# Patient Record
Sex: MALE | Race: OTHER | HISPANIC OR LATINO | Employment: FULL TIME | URBAN - METROPOLITAN AREA
[De-identification: names, ages, dates, MRNs, and addresses within clinical notes are randomized per-mention and may not be internally consistent; named-entity substitution may affect disease eponyms.]

---

## 2023-03-22 ENCOUNTER — APPOINTMENT (OUTPATIENT)
Dept: RADIOLOGY | Facility: MEDICAL CENTER | Age: 23
End: 2023-03-22
Attending: EMERGENCY MEDICINE

## 2023-03-22 ENCOUNTER — HOSPITAL ENCOUNTER (EMERGENCY)
Facility: MEDICAL CENTER | Age: 23
End: 2023-03-23
Attending: EMERGENCY MEDICINE

## 2023-03-22 DIAGNOSIS — M54.50 ACUTE MIDLINE LOW BACK PAIN WITHOUT SCIATICA: ICD-10-CM

## 2023-03-22 DIAGNOSIS — S32.039A CLOSED FRACTURE OF THIRD LUMBAR VERTEBRA, UNSPECIFIED FRACTURE MORPHOLOGY, INITIAL ENCOUNTER (HCC): Primary | ICD-10-CM
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
## 2023-03-22 DIAGNOSIS — S32.059A CLOSED FRACTURE OF FIFTH LUMBAR VERTEBRA, UNSPECIFIED FRACTURE MORPHOLOGY, INITIAL ENCOUNTER (HCC): ICD-10-CM

## 2023-03-22 PROCEDURE — 99283 EMERGENCY DEPT VISIT LOW MDM: CPT

## 2023-03-22 PROCEDURE — 73610 X-RAY EXAM OF ANKLE: CPT | Mod: RT

## 2023-03-22 PROCEDURE — 99283 EMERGENCY DEPT VISIT LOW MDM: CPT | Performed by: SURGERY

## 2023-03-22 PROCEDURE — 307742 HCHG YELLOW TRAUMA TEAM SERVICES

## 2023-03-22 PROCEDURE — 72131 CT LUMBAR SPINE W/O DYE: CPT

## 2023-03-23 VITALS
HEIGHT: 66 IN | BODY MASS INDEX: 32.62 KG/M2 | SYSTOLIC BLOOD PRESSURE: 146 MMHG | TEMPERATURE: 97.2 F | WEIGHT: 203 LBS | HEART RATE: 70 BPM | RESPIRATION RATE: 20 BRPM | OXYGEN SATURATION: 95 % | DIASTOLIC BLOOD PRESSURE: 57 MMHG

## 2023-03-23 PROBLEM — S32.9XXA: Status: ACTIVE | Noted: 2023-03-23

## 2023-03-23 PROBLEM — T14.90XA TRAUMA: Status: ACTIVE | Noted: 2023-03-23

## 2023-03-23 RX ORDER — ACETAMINOPHEN 500 MG
1000 TABLET ORAL EVERY 6 HOURS PRN
Qty: 20 TABLET | Refills: 0 | Status: SHIPPED | OUTPATIENT
Start: 2023-03-23 | End: 2023-03-27

## 2023-03-23 RX ORDER — IBUPROFEN 800 MG/1
800 TABLET ORAL EVERY 8 HOURS PRN
Qty: 20 TABLET | Refills: 0 | Status: SHIPPED | OUTPATIENT
Start: 2023-03-23 | End: 2023-03-26

## 2023-03-23 RX ORDER — CYCLOBENZAPRINE HCL 10 MG
10 TABLET ORAL 3 TIMES DAILY PRN
Qty: 10 TABLET | Refills: 0 | Status: SHIPPED | OUTPATIENT
Start: 2023-03-23 | End: 2023-03-26

## 2023-03-23 NOTE — ED NOTES
Pt BIB self, walked into [facility]. Was working earlier in the day cutting trees, fell from ladder greater than 20ft. Went home, later in day was feeling discomfort to posterior spine and right ankle, walked into ED.

## 2023-03-23 NOTE — H&P
"    CHIEF COMPLAINT: Back pain.     HISTORY OF PRESENT ILLNESS: The patient is a 22 year-old  young man who was injured in a fall. The patient experienced a 20 foot fall while working on a ladder cutting tree branches. The patient's injury occurred at approximately 1100 AM today. After the incident he rested for a period of time and when he noted he had persistent low back pain he presented to the [facility] ER for further evaluation. He had no loss of consciousness. The patient denies any chronic anticoagulation or antiplatelet medications. He complains of pain in his lower back. He denies weakness, numbness, or paresthesias.    TRIAGE CATEGORY: The patient was triaged as a Trauma Yellow Activation. An expeditious primary and secondary survey with required adjuncts was conducted. See Trauma Narrator for full details.    PAST MEDICAL HISTORY:  has no past medical history on file.    PAST SURGICAL HISTORY:  has no past surgical history on file.    ALLERGIES: No Known Allergies    CURRENT MEDICATIONS:   Home Medications    **Home medications have not yet been reviewed for this encounter**       FAMILY HISTORY: family history is not on file.    SOCIAL HISTORY:  reports that he has never smoked. He has never used smokeless tobacco. He reports current alcohol use. He reports current drug use. Drug: Inhaled.    REVIEW OF SYSTEMS: Comprehensive review of systems is negative with the exception of the aforementioned HPI, PMH, and PSH bullets in accordance with CMS guidelines.    PHYSICAL EXAMINATION:      Vital Signs: BP (!) 146/57   Pulse 70   Temp 36.2 °C (97.2 °F) (Temporal)   Resp 20   Ht 1.676 m (5' 6\")   Wt 92.1 kg (203 lb)   SpO2 95%   Physical Exam  Constitutional:       General: He is not in acute distress.  HENT:      Head: Atraumatic.      Right Ear: External ear normal.      Left Ear: External ear normal.      Mouth/Throat:      Mouth: Mucous membranes are moist.      Pharynx: Oropharynx is clear. "   Eyes:      General: No scleral icterus.     Pupils: Pupils are equal, round, and reactive to light.   Cardiovascular:      Rate and Rhythm: Normal rate and regular rhythm.      Pulses: Normal pulses.   Pulmonary:      Effort: Pulmonary effort is normal. No respiratory distress.      Breath sounds: Normal breath sounds.   Abdominal:      General: There is no distension.      Palpations: Abdomen is soft.      Tenderness: There is no abdominal tenderness. There is no guarding or rebound.   Genitourinary:     Penis: Normal.    Musculoskeletal:         General: No deformity. Normal range of motion.      Cervical back: Normal range of motion and neck supple. No deformity or tenderness. Normal range of motion.      Thoracic back: No deformity or tenderness.      Lumbar back: Tenderness present. No deformity.      Right ankle: No deformity. Normal range of motion.      Right Achilles Tendon: Tenderness present.   Skin:     General: Skin is warm and dry.      Capillary Refill: Capillary refill takes less than 2 seconds.      Coloration: Skin is not jaundiced.   Neurological:      General: No focal deficit present.      Mental Status: He is alert and oriented to person, place, and time.      GCS: GCS eye subscore is 4. GCS verbal subscore is 5. GCS motor subscore is 6.   Psychiatric:         Behavior: Behavior is cooperative.       LABORATORY VALUES:                      IMAGING:   CT-LSPINE W/O PLUS RECONS   Final Result         1.  L3 fracture involving anterior and middle columns.   2.  L5 fracture involving anterior and middle columns.      DX-ANKLE 3+ VIEWS RIGHT   Final Result         1.  No acute traumatic bony injury.             ASSESSMENT AND PLAN:     Trauma  20 foot fall while working on ladder at 1100 today.  Trauma Yellow Activation.  Onesimo Corbin MD. Trauma Surgery.    Fracture of lumbosacral spine (HCC)  L3 and L5 fractures involving the anterior and middle columns.  Non-operative management.    Off-the-shelf TLSO bracing. Outpatient follow-up.  Mahendra Lawrence MD. Neurosurgeon. [facility].      DISPOSITION: Home.         ____________________________________     Onesimo Corbin M.D.    DD: 3/22/2023  11:07 PM

## 2023-03-23 NOTE — ASSESSMENT & PLAN NOTE
20 foot fall while working on ladder at 1100 today.  Trauma Yellow Activation.  Onesimo Corbin MD. Trauma Surgery.

## 2023-03-23 NOTE — DISCHARGE INSTRUCTIONS
You have 2 small fractures in your back.  These will not likely require surgery but do want you to follow-up with the surgical team's name and number I gave you.  They would like you to wear this brace given to you today, and to follow-up with them.  I have sent pain medications and muscle relaxers to the pharmacy for you.  If you have worsening symptoms or concerns, please return to the ED.  Thank you for coming in today.

## 2023-03-23 NOTE — ED TRIAGE NOTES
Chief Complaint   Patient presents with    Trauma Yellow     States that at apx 1100 today he was apx 20ft up on a ladder when he fell landing on his feet. Pt has no obvious deformities but is c/o R ankle pain and lumbar pain.      Pt ambulatory to triage for above complaint. Pt activated as trauma yellow.

## 2023-03-23 NOTE — ASSESSMENT & PLAN NOTE
L3 and L5 fractures involving the anterior and middle columns.  Non-operative management.   Off-the-shelf TLSO bracing. Outpatient follow-up.  Mahendra Lawrence MD. Neurosurgeon. [facility].

## 2023-03-23 NOTE — ED PROVIDER NOTES
ED Provider Note    Scribed for Eric Escamilla by Kev Renae. 3/22/2023  11:00 PM    Primary care provider: None noted  Means of arrival: walk in  History obtained from: Patient  History limited by: None    CHIEF COMPLAINT  Chief Complaint   Patient presents with    Trauma Yellow     States that at apx 1100 today he was apx 20ft up on a ladder when he fell landing on his feet. Pt has no obvious deformities but is c/o R ankle pain and lumbar pain.      HPI/ROS    LIMITATION TO HISTORY   Select: : None    HPI  Isaac Santos is a 22 y.o. male who presents to the Emergency Department as a trauma yellow for a fall from a ladder onset earlier today. He states that at 11 AM he was working in a tree to cut branches when he fell on dirt. He thinks that his fall was from approximately 20 feet. He landed on dirt on his side, he did not lose consciousness. He presented after trying to lay down, this exacerbated his pain. He is currently complaining of some lumbar back pain and right ankle pain. He denies any chest pain or abdominal pain.    REVIEW OF SYSTEMS  As above, all other systems reviewed and are negative.   See HPI for further details.     PAST MEDICAL HISTORY   None noted    SURGICAL HISTORY  patient denies any surgical history    SOCIAL HISTORY  Social History     Tobacco Use    Smoking status: Never    Smokeless tobacco: Never   Substance Use Topics    Alcohol use: Yes     Comment: Occ    Drug use: Yes     Types: Inhaled     Comment: Marijuana      Social History     Substance and Sexual Activity   Drug Use Yes    Types: Inhaled    Comment: Marijuana     FAMILY HISTORY  None noted    CURRENT MEDICATIONS  No current outpatient medications     ALLERGIES  No Known Allergies    PHYSICAL EXAM    VITAL SIGNS:   Vitals:    03/22/23 2257 03/22/23 2259 03/22/23 2309 03/23/23 0001   BP: (!) 144/87 (!) 155/72 (!) 144/81 (!) 146/57   Pulse: 83 80 78 70   Resp: 20 20     Temp:       TempSrc:        SpO2: 98% 97% 96% 95%   Weight:       Height:         Vitals: My interpretation: hypertensive, not tachycardic, afebrile, not hypoxic    Reinterpretation of vitals: Hypertensive otherwise unremarkable    PE:   Gen: sitting comfortably, speaking clearly, appears in no acute distress   HENT: Atraumatic, Mucous membranes moist, posterior pharynx clear, uvula midline, nares patent bilaterally   Neck: Supple, FROM  Pulmonary: Lungs are clear to auscultation bilaterally. No tachypnea  CV:  RRR, no murmur appreciated, pulses 2+ in both upper and lower extremities  Abdomen: soft, NT/ND; no rebound/guarding  : no CVA or suprapubic tenderness   Back: No C or T-spine tenderness, step off or defmority. Mild tenderness to the L-spine without step off or deformity. NVI  Extremities: Right ankle has minimal tenderness, ROM intact, NVI  Neuro: A&Ox4 (person, place, time, situation), speech fluent, gait steady, no focal deficits appreciated  Skin: No rash or lesions.  No pallor or jaundice.  No cyanosis.  Warm and dry.     DIAGNOSTIC STUDIES / PROCEDURES    RADIOLOGY  I have independently interpreted the diagnostic imaging associated with this visit and am waiting the final reading from the radiologist.   My preliminary interpretation is a follows: Ankle x-ray: No acute fracture or dislocation  Radiologist interpretation is as follows:  CT-LSPINE W/O PLUS RECONS   Final Result         1.  L3 fracture involving anterior and middle columns.   2.  L5 fracture involving anterior and middle columns.      DX-ANKLE 3+ VIEWS RIGHT   Final Result         1.  No acute traumatic bony injury.           COURSE & MEDICAL DECISION MAKING  Nursing notes, VS, PMSFHx, labs, imaging, EKG reviewed in chart.    ED Observation Status? Yes; I am placing the patient in to an observation status due to a diagnostic uncertainty as well as therapeutic intensity. Patient placed in observation status at 11:06 PM, 3/22/2023.     Observation plan is as  follows: Monitor for symptom management and diagnostic imaging    Upon Reevaluation, the patient's condition has: Improved; and will be discharged.    Patient discharged from ED Observation status at 12:09 AM (Time) 3/23/23 (Date).     Ddx: strain vs sprain vs fracture vs dislocation    MDM: 11:00 PM Iasac Santos is a 22 y.o. male who presented with evaluation of acute, severe lower back pain.  Patient was on a ladder cutting branches, fell earlier today, approximately 12 hours prior to arrival to the emergency department.  He states he landed on his right side but did not hit his head or lose consciousness.  He is ambulatory and seen in triage and was made a trauma yellow due to mechanism of injury.  He ambulates in the trauma bay, alert and oriented, GCS of 15, airway breathing circulation intact.  He has no neurological deficits.  Denies any loss of bladder or bowel continence.  He has an atraumatic exam and his only complaint is some mild tenderness in his lower back that is midline in nature and some mild tenderness in his right ankle.  Right ankle was unremarkable on physical exam other than some mild tenderness, range of motion is intact and he is neurovascular intact.  X-ray negative in the trauma bay by my read.  Sent for CT imaging of the lumbar spine which does show compression fractures of L3 and L5 consistent with patient's presentation and pain as well as mechanism.  Discussed with the neurosurgery team who recommends TLSO brace and outpatient follow-up with them on a nonemergent basis.  This information was given to the patient.  I sent prescriptions for Tylenol, Motrin and Flexeril to the pharmacy for the patient that he will follow-up with his outpatient team for further evaluation as needed.  Discussed tricked return precautions with him and he verbalizes understanding and is amenable.    12:01 AM I discussed the patient's case and the above findings with Dr. Lawrence (Spine) who  recommends OSL braces and outpatient follow up.    ADDITIONAL PROBLEM LIST AND DISPOSITION    I have discussed management of the patient with the following physicians and ILIANA's:  Dr. Lawrence (Spine)    Discussion of management with other [facility] or appropriate source(s): None     Escalation of care considered, and ultimately not performed:the patient was evaluated by myself, after discussion I have recommended the patient to be discharged    Barriers to care at this time, including but not limited to: None    FINAL IMPRESSION  1. Closed fracture of third lumbar vertebra, unspecified fracture morphology, initial encounter ([facility]) Acute   2. Fall, initial encounter Acute   3. Closed fracture of fifth lumbar vertebra, unspecified fracture morphology, initial encounter ([facility]) Acute   4. Acute midline low back pain without sciatica Acute      Kev HOPKINS (Scribe), am scribing for, and in the presence of, Eric Escamilla.    Electronically signed by: Kev Renae (Ady), 3/22/2023    IEric personally performed the services described in this documentation, as scribed by Kev Renae in my presence, and it is both accurate and complete.    The note accurately reflects work and decisions made by me.  Eric Escamilla  3/23/2023  12:14 AM